# Patient Record
Sex: MALE | Race: WHITE | NOT HISPANIC OR LATINO
[De-identification: names, ages, dates, MRNs, and addresses within clinical notes are randomized per-mention and may not be internally consistent; named-entity substitution may affect disease eponyms.]

---

## 2024-09-03 ENCOUNTER — APPOINTMENT (OUTPATIENT)
Dept: OPHTHALMOLOGY | Facility: CLINIC | Age: 64
End: 2024-09-03

## 2024-09-11 PROBLEM — Z00.00 ENCOUNTER FOR PREVENTIVE HEALTH EXAMINATION: Status: ACTIVE | Noted: 2024-09-11

## 2024-09-23 ENCOUNTER — APPOINTMENT (OUTPATIENT)
Dept: ORTHOPEDIC SURGERY | Facility: CLINIC | Age: 64
End: 2024-09-23
Payer: COMMERCIAL

## 2024-09-23 VITALS — BODY MASS INDEX: 23.49 KG/M2 | HEIGHT: 68 IN | WEIGHT: 155 LBS

## 2024-09-23 DIAGNOSIS — I10 ESSENTIAL (PRIMARY) HYPERTENSION: ICD-10-CM

## 2024-09-23 DIAGNOSIS — Z78.9 OTHER SPECIFIED HEALTH STATUS: ICD-10-CM

## 2024-09-23 DIAGNOSIS — E78.00 PURE HYPERCHOLESTEROLEMIA, UNSPECIFIED: ICD-10-CM

## 2024-09-23 DIAGNOSIS — Z87.891 PERSONAL HISTORY OF NICOTINE DEPENDENCE: ICD-10-CM

## 2024-09-23 DIAGNOSIS — M70.20 OLECRANON BURSITIS, UNSPECIFIED ELBOW: ICD-10-CM

## 2024-09-23 DIAGNOSIS — Z86.73 PERSONAL HISTORY OF TRANSIENT ISCHEMIC ATTACK (TIA), AND CEREBRAL INFARCTION W/OUT RESIDUAL DEFICITS: ICD-10-CM

## 2024-09-23 DIAGNOSIS — M72.0 PALMAR FASCIAL FIBROMATOSIS [DUPUYTREN]: ICD-10-CM

## 2024-09-23 PROCEDURE — 99203 OFFICE O/P NEW LOW 30 MIN: CPT

## 2024-09-23 RX ORDER — CARVEDILOL 25 MG/1
25 TABLET, FILM COATED ORAL
Refills: 0 | Status: ACTIVE | COMMUNITY

## 2024-09-23 RX ORDER — CLOPIDOGREL BISULFATE 300 MG/1
TABLET, FILM COATED ORAL
Refills: 0 | Status: ACTIVE | COMMUNITY

## 2024-09-23 RX ORDER — SACUBITRIL AND VALSARTAN 49; 51 MG/1; MG/1
TABLET, FILM COATED ORAL
Refills: 0 | Status: ACTIVE | COMMUNITY

## 2024-09-23 RX ORDER — ATORVASTATIN CALCIUM 80 MG/1
TABLET, FILM COATED ORAL
Refills: 0 | Status: ACTIVE | COMMUNITY

## 2024-09-24 PROBLEM — M70.20 OLECRANON BURSITIS: Status: ACTIVE | Noted: 2024-09-24

## 2024-09-24 PROBLEM — M72.0 DUPUYTREN CONTRACTURE: Status: ACTIVE | Noted: 2024-09-24

## 2024-09-24 NOTE — IMAGING
[de-identified] : LEFT ELBOW EXAM Posterior olecranon swelling, no erythema, -ttp Skin intact No deformity, edema, or ecchymosis Hand with brisk capillary refill, warm and well perfused Motor function intact to AIN, PIN, ulnar nerves Sensation intact median, ulnar, radial nerves Elbow ROM   Flexion 135   Extension to 0   Supination 85   Pronation 85 No elbow instability noted Strength for elbow flexion & extension is 5/5 Hand and wrist motion is intact and full Patient able to make a composite fist

## 2024-09-24 NOTE — HISTORY OF PRESENT ILLNESS
[de-identified] : Age: 63 year M PMHx: HTN, hx of stroke, hyperlipidemia  Hand Dominance: LHD Chief Complaint: Patient c/o left elbow swelling x August 2024, NKI. Patient reports he is not in any pain. Patient reports he was seen by his PCP for about 2 weeks ago for this and had bloodwork done. Patent reports he leans on his elbows on the kitchen table while he reads the newspaper every morning. Patient denies taking any medications. Patient denies numbness/tingling.  Trauma: NKI Outside Imaging/Treatment: Patient was seen by his PCP for about 2 weeks ago. Patient reports he had bloodwork done.  OTC Medications: NKI OT/PT: None Bracing: None Pain worse with: No pain Pain better with: No pain

## 2024-09-24 NOTE — HISTORY OF PRESENT ILLNESS
[de-identified] : Age: 63 year M PMHx: HTN, hx of stroke, hyperlipidemia  Hand Dominance: LHD Chief Complaint: Patient c/o left elbow swelling x August 2024, NKI. Patient reports he is not in any pain. Patient reports he was seen by his PCP for about 2 weeks ago for this and had bloodwork done. Patent reports he leans on his elbows on the kitchen table while he reads the newspaper every morning. Patient denies taking any medications. Patient denies numbness/tingling.  Trauma: NKI Outside Imaging/Treatment: Patient was seen by his PCP for about 2 weeks ago. Patient reports he had bloodwork done.  OTC Medications: NKI OT/PT: None Bracing: None Pain worse with: No pain Pain better with: No pain

## 2024-09-24 NOTE — IMAGING
[de-identified] : LEFT ELBOW EXAM Posterior olecranon swelling, no erythema, -ttp Skin intact No deformity, edema, or ecchymosis Hand with brisk capillary refill, warm and well perfused Motor function intact to AIN, PIN, ulnar nerves Sensation intact median, ulnar, radial nerves Elbow ROM   Flexion 135   Extension to 0   Supination 85   Pronation 85 No elbow instability noted Strength for elbow flexion & extension is 5/5 Hand and wrist motion is intact and full Patient able to make a composite fist

## 2024-09-24 NOTE — ASSESSMENT
[FreeTextEntry1] : Left Olecranon Bursitis The diagnosis of olecranon bursitis was discussed with the patient.  The pathophysiology of this condition was discussed in detail, and the purpose of the olecranon bursa itself was also discussed.  With minimal if any symptomatology suggesting a septic bursitis, the difficulty in treating this condition was discussed.  The mainstays of treatment, including observation, activity modification (absolutely no pressure on the tip of the elbow) and compressive dressing/sleeve were discussed with the patient.  At this time I have recommended against aspiration with the risk of chronic bursal drainage.  The potentially lengthy natural history of this condition was also discussed.  I have explained to the patient that surgery is typically reserved for septic olecranon bursitis, or aseptic bursitis that is recalcitrant to other treatment modalities that is persistently bothersome to the patient, either due to local mass effect or due to aesthetics/cosmesis.  All of the patient's questions were answered to their satisfaction.  F/u 3mo/prn

## 2024-11-20 ENCOUNTER — APPOINTMENT (OUTPATIENT)
Dept: ORTHOPEDIC SURGERY | Facility: AMBULATORY SURGERY CENTER | Age: 64
End: 2024-11-20

## 2024-11-20 PROCEDURE — 26040 RELEASE PALM CONTRACTURE: CPT | Mod: RT

## 2024-12-02 ENCOUNTER — APPOINTMENT (OUTPATIENT)
Dept: ORTHOPEDIC SURGERY | Facility: CLINIC | Age: 64
End: 2024-12-02

## 2025-01-23 ENCOUNTER — APPOINTMENT (OUTPATIENT)
Dept: NEUROLOGY | Facility: CLINIC | Age: 65
End: 2025-01-23

## 2025-02-13 DIAGNOSIS — E78.5 HYPERLIPIDEMIA, UNSPECIFIED: ICD-10-CM

## 2025-02-13 RX ORDER — AMLODIPINE BESYLATE 2.5 MG/1
2.5 TABLET ORAL DAILY
Refills: 0 | Status: ACTIVE | COMMUNITY
Start: 2025-02-13

## 2025-02-21 ENCOUNTER — APPOINTMENT (OUTPATIENT)
Age: 65
End: 2025-02-21
Payer: COMMERCIAL

## 2025-02-21 VITALS
HEART RATE: 57 BPM | SYSTOLIC BLOOD PRESSURE: 140 MMHG | BODY MASS INDEX: 23.49 KG/M2 | DIASTOLIC BLOOD PRESSURE: 86 MMHG | HEIGHT: 68 IN | OXYGEN SATURATION: 98 % | WEIGHT: 155 LBS

## 2025-02-21 DIAGNOSIS — I42.9 CARDIOMYOPATHY, UNSPECIFIED: ICD-10-CM

## 2025-02-21 DIAGNOSIS — I25.10 ATHEROSCLEROTIC HEART DISEASE OF NATIVE CORONARY ARTERY W/OUT ANGINA PECTORIS: ICD-10-CM

## 2025-02-21 DIAGNOSIS — I63.9 CEREBRAL INFARCTION, UNSPECIFIED: ICD-10-CM

## 2025-02-21 PROCEDURE — 93000 ELECTROCARDIOGRAM COMPLETE: CPT

## 2025-02-21 PROCEDURE — 99204 OFFICE O/P NEW MOD 45 MIN: CPT
